# Patient Record
Sex: MALE | NOT HISPANIC OR LATINO | URBAN - METROPOLITAN AREA
[De-identification: names, ages, dates, MRNs, and addresses within clinical notes are randomized per-mention and may not be internally consistent; named-entity substitution may affect disease eponyms.]

---

## 2024-01-09 DIAGNOSIS — J30.2 SEASONAL ALLERGIES: ICD-10-CM

## 2024-01-09 PROBLEM — J18.9 COMMUNITY ACQUIRED PNEUMONIA: Status: RESOLVED | Noted: 2024-01-09 | Resolved: 2024-01-09

## 2024-01-09 PROBLEM — J18.9 PNEUMONIA: Status: RESOLVED | Noted: 2024-01-09 | Resolved: 2024-01-09

## 2024-01-09 RX ORDER — FLUTICASONE PROPIONATE 50 MCG
1 SPRAY, SUSPENSION (ML) NASAL DAILY
Qty: 16 G | Refills: 11 | Status: SHIPPED | OUTPATIENT
Start: 2024-01-09

## 2024-01-09 RX ORDER — FLUTICASONE PROPIONATE 50 MCG
1 SPRAY, SUSPENSION (ML) NASAL DAILY
COMMUNITY
Start: 2023-11-03 | End: 2024-01-09 | Stop reason: SDUPTHER

## 2024-01-31 ENCOUNTER — TELEPHONE (OUTPATIENT)
Dept: PRIMARY CARE | Facility: CLINIC | Age: 69
End: 2024-01-31
Payer: MEDICARE

## 2024-01-31 DIAGNOSIS — R13.19 OTHER DYSPHAGIA: Primary | ICD-10-CM

## 2024-01-31 NOTE — TELEPHONE ENCOUNTER
Bryanna contacted this nurse to request a modified barium swallow order for patient at the [facility] notes patient to have to orders for liquids: Honey/nectar thick.  Requests modified barium swallow to evaluation which is correct liquid consistency for patient.  Please advise.

## 2024-02-21 ENCOUNTER — HOSPITAL ENCOUNTER (OUTPATIENT)
Dept: RADIOLOGY | Facility: HOSPITAL | Age: 69
Discharge: HOME | End: 2024-02-21
Payer: MEDICARE

## 2024-02-21 DIAGNOSIS — R13.19 OTHER DYSPHAGIA: ICD-10-CM

## 2024-02-21 PROCEDURE — 92611 MOTION FLUOROSCOPY/SWALLOW: CPT | Mod: GN | Performed by: SPEECH-LANGUAGE PATHOLOGIST

## 2024-02-21 PROCEDURE — 74230 X-RAY XM SWLNG FUNCJ C+: CPT

## 2024-02-21 PROCEDURE — 3430000001 HC RX 343 DIAGNOSTIC RADIOPHARMACEUTICALS: Performed by: NURSE PRACTITIONER

## 2024-02-21 PROCEDURE — 2500000001 HC RX 250 WO HCPCS SELF ADMINISTERED DRUGS (ALT 637 FOR MEDICARE OP): Performed by: NURSE PRACTITIONER

## 2024-02-21 RX ADMIN — BARIUM SULFATE 90 ML: 400 SUSPENSION ORAL at 13:55

## 2024-02-21 RX ADMIN — BARIUM SULFATE 10 ML: 400 PASTE ORAL at 13:59

## 2024-02-21 RX ADMIN — BARIUM SULFATE 5 ML: 400 SUSPENSION ORAL at 13:57

## 2024-02-21 NOTE — PROCEDURES
"Speech-Language Pathology        Adult Outpatient Modified Barium Swallow Study     Patient Name: Donald Manweiler  MRN: [id number]  : 1955  Today's Date: 24     Time Calculation  Start Time: 1315  Stop Time: 1340  Time Calculation (min): 25 min    Recommendations:  Solid Diet Recommendations: Puree Solids (IDDSI 4)  Liquid Diet Recommendations: Mildly Thick Liquids (IDDSI 2, [place] Thick Liquids)  Medication Administration Recommendations: Crushed in puree  Compensatory Strategy Recommendations: Upright posture, Supervision/assistance with meals, Small Single bites/sips, Alternating liquids/solids, Aspiration precautions, Slow rate, Multiple swallows per bite, Oral care      Assessment/Impression:    Full detailed SLP/Radiologist Modified Barium Swallow study report can be found under Chart Review tab, Imaging tab and  titled \"FL Modified Barium Swallow Study\"      Study limited d/t patient cooperation. Full visualization difficulty at times due to patients rocking behavior and body habitus. No penetration or aspiration observed during the confines of this study. Oral phase dysphagia characterized by disorganized chewing/mashing, repetitive/disorganized tongue motion and residue collection. Delayed, yet functional, initiation of pharyngeal swallow. Pharyngeal residuals partially cleared with multiple swallows. Attempted to utilize liquid wash to achieve full pharyngeal clearance, however, patient refused. Thin liquids not presented as patient declined further trials despite max encouragement.       Plan:  Treatment/Interventions: Caregiver education and training in use of safe swallow compensatory strategies.       Education:   Pt/caregiver education provided re: results of MBS study, recommended diet and recommended safe swallow strategies; verbalized understanding.   "

## 2024-03-20 ENCOUNTER — TELEPHONE (OUTPATIENT)
Dept: PRIMARY CARE | Facility: CLINIC | Age: 69
End: 2024-03-20
Payer: MEDICARE

## 2024-03-20 NOTE — TELEPHONE ENCOUNTER
Phoned Arlette Little/ [address]  and confirmed 3/21/24 [facility] visit with Cleo Min NP. Phoned Wen ([phone]) at [place] [facility] workshop and updated her on provider's visit.

## 2024-03-21 ENCOUNTER — OFFICE VISIT (OUTPATIENT)
Dept: PRIMARY CARE | Facility: CLINIC | Age: 69
End: 2024-03-21
Payer: MEDICARE

## 2024-03-21 DIAGNOSIS — F72 SEVERE INTELLECTUAL DISABILITY: Chronic | ICD-10-CM

## 2024-03-21 DIAGNOSIS — N32.81 OVERACTIVE BLADDER: Chronic | ICD-10-CM

## 2024-03-21 DIAGNOSIS — I48.0 PAROXYSMAL ATRIAL FIBRILLATION (MULTI): Primary | ICD-10-CM

## 2024-03-21 DIAGNOSIS — R19.7 DIARRHEA, UNSPECIFIED TYPE: ICD-10-CM

## 2024-03-21 DIAGNOSIS — G40.909 SEIZURE DISORDER (MULTI): Chronic | ICD-10-CM

## 2024-03-21 PROBLEM — A41.9 SEPSIS (MULTI): Status: RESOLVED | Noted: 2024-03-21 | Resolved: 2024-03-21

## 2024-03-21 PROBLEM — J45.51 SEVERE PERSISTENT ASTHMA WITH ACUTE EXACERBATION (MULTI): Status: ACTIVE | Noted: 2024-03-21

## 2024-03-21 PROBLEM — K21.9 GASTROESOPHAGEAL REFLUX DISEASE WITHOUT ESOPHAGITIS: Status: ACTIVE | Noted: 2024-03-21

## 2024-03-21 PROBLEM — K59.01 SLOW TRANSIT CONSTIPATION: Status: ACTIVE | Noted: 2024-03-21

## 2024-03-21 PROBLEM — R50.9 FEVER: Status: RESOLVED | Noted: 2024-03-21 | Resolved: 2024-03-21

## 2024-03-21 PROBLEM — E55.9 VITAMIN D DEFICIENCY: Status: ACTIVE | Noted: 2024-03-21

## 2024-03-21 PROBLEM — K29.50 CHRONIC GASTRITIS WITHOUT BLEEDING: Status: ACTIVE | Noted: 2024-03-21

## 2024-03-21 PROBLEM — R26.2 IMPAIRED AMBULATION: Status: ACTIVE | Noted: 2020-04-29

## 2024-03-21 PROBLEM — H61.22 IMPACTED CERUMEN OF LEFT EAR: Status: RESOLVED | Noted: 2022-09-08 | Resolved: 2024-03-21

## 2024-03-21 PROBLEM — E78.5 HYPERLIPIDEMIA: Status: ACTIVE | Noted: 2020-04-28

## 2024-03-21 PROBLEM — M19.90 DEGENERATIVE JOINT DISEASE: Status: ACTIVE | Noted: 2024-03-21

## 2024-03-21 PROBLEM — R32 URINARY INCONTINENCE: Status: ACTIVE | Noted: 2024-03-21

## 2024-03-21 PROBLEM — G93.40 ENCEPHALOPATHY: Status: ACTIVE | Noted: 2020-04-28

## 2024-03-21 PROBLEM — Z86.0100 HISTORY OF COLONIC POLYPS: Status: ACTIVE | Noted: 2024-03-21

## 2024-03-21 PROBLEM — I10 ESSENTIAL HYPERTENSION: Status: ACTIVE | Noted: 2020-04-28

## 2024-03-21 PROBLEM — Z86.010 HISTORY OF COLONIC POLYPS: Status: ACTIVE | Noted: 2024-03-21

## 2024-03-21 PROBLEM — F65.89: Status: ACTIVE | Noted: 2024-03-21

## 2024-03-21 PROBLEM — J20.9 ACUTE BRONCHITIS: Status: RESOLVED | Noted: 2024-03-21 | Resolved: 2024-03-21

## 2024-03-21 PROBLEM — N40.0 BENIGN PROSTATIC HYPERPLASIA: Status: ACTIVE | Noted: 2024-03-21

## 2024-03-21 PROBLEM — N39.41 URGE INCONTINENCE: Status: ACTIVE | Noted: 2024-03-21

## 2024-03-21 PROCEDURE — 1159F MED LIST DOCD IN RCRD: CPT | Performed by: NURSE PRACTITIONER

## 2024-03-21 PROCEDURE — 99349 HOME/RES VST EST MOD MDM 40: CPT | Performed by: NURSE PRACTITIONER

## 2024-03-21 PROCEDURE — 1160F RVW MEDS BY RX/DR IN RCRD: CPT | Performed by: NURSE PRACTITIONER

## 2024-03-21 PROCEDURE — 3074F SYST BP LT 130 MM HG: CPT | Performed by: NURSE PRACTITIONER

## 2024-03-21 PROCEDURE — 1157F ADVNC CARE PLAN IN RCRD: CPT | Performed by: NURSE PRACTITIONER

## 2024-03-21 PROCEDURE — 3078F DIAST BP <80 MM HG: CPT | Performed by: NURSE PRACTITIONER

## 2024-03-21 RX ORDER — SERTRALINE HYDROCHLORIDE 100 MG/1
100 TABLET, FILM COATED ORAL DAILY
COMMUNITY

## 2024-03-21 RX ORDER — TAMSULOSIN HYDROCHLORIDE 0.4 MG/1
0.4 CAPSULE ORAL DAILY
COMMUNITY
Start: 2022-09-22 | End: 2024-03-29

## 2024-03-21 RX ORDER — SOTALOL HYDROCHLORIDE 120 MG/1
120 TABLET ORAL 2 TIMES DAILY
COMMUNITY
Start: 2022-09-22 | End: 2024-03-29

## 2024-03-21 RX ORDER — SENNOSIDES 8.6 MG/1
1 TABLET ORAL DAILY PRN
COMMUNITY

## 2024-03-21 RX ORDER — ACETAMINOPHEN 500 MG
2000 TABLET ORAL DAILY
COMMUNITY
End: 2024-03-29 | Stop reason: WASHOUT

## 2024-03-21 RX ORDER — DOCUSATE SODIUM 100 MG/1
100 CAPSULE, LIQUID FILLED ORAL DAILY PRN
COMMUNITY
End: 2024-03-23 | Stop reason: WASHOUT

## 2024-03-21 RX ORDER — MIRABEGRON 25 MG/1
25 TABLET, FILM COATED, EXTENDED RELEASE ORAL DAILY
COMMUNITY
End: 2024-03-29

## 2024-03-21 RX ORDER — FAMOTIDINE 20 MG/1
20 TABLET, FILM COATED ORAL NIGHTLY PRN
COMMUNITY

## 2024-03-21 RX ORDER — MIRTAZAPINE 7.5 MG/1
7.5 TABLET, FILM COATED ORAL NIGHTLY
COMMUNITY
Start: 2022-09-22 | End: 2024-03-29

## 2024-03-21 RX ORDER — LEVETIRACETAM 500 MG/1
500 TABLET ORAL 2 TIMES DAILY
COMMUNITY

## 2024-03-21 RX ORDER — OMEPRAZOLE 20 MG/1
20 CAPSULE, DELAYED RELEASE ORAL
COMMUNITY
Start: 2020-04-29 | End: 2024-03-29

## 2024-03-21 RX ORDER — LAMOTRIGINE 100 MG/1
100 TABLET ORAL 2 TIMES DAILY
COMMUNITY
Start: 2020-11-24

## 2024-03-21 RX ORDER — LISINOPRIL 10 MG/1
10 TABLET ORAL DAILY
COMMUNITY
End: 2024-03-29

## 2024-03-21 RX ORDER — LOPERAMIDE HCL 1MG/7.5ML
2 LIQUID (ML) ORAL 4 TIMES DAILY PRN
COMMUNITY
End: 2024-04-18

## 2024-03-23 VITALS
HEART RATE: 64 BPM | SYSTOLIC BLOOD PRESSURE: 118 MMHG | DIASTOLIC BLOOD PRESSURE: 64 MMHG | RESPIRATION RATE: 16 BRPM | OXYGEN SATURATION: 96 % | TEMPERATURE: 97.6 F | BODY MASS INDEX: 21.35 KG/M2 | WEIGHT: 136 LBS | HEIGHT: 67 IN

## 2024-03-23 ASSESSMENT — ENCOUNTER SYMPTOMS
DIARRHEA: 1
RESPIRATORY NEGATIVE: 1
EYES NEGATIVE: 1
APPETITE CHANGE: 1
ACTIVITY CHANGE: 0
SLEEP DISTURBANCE: 1
TROUBLE SWALLOWING: 1
NEUROLOGICAL NEGATIVE: 1

## 2024-03-23 NOTE — PROGRESS NOTES
Subjective   Patient ID: Donald Manweiler is a 69 y.o. male who presents for No chief complaint on file..    Patient seen today in a private group home residence. He is awake poor historian due to MRDD. House Nurse is present for home visits providing historical information. Patient is homebound due to overall functional decline related to his MRDD. PMH: Developmental Delay, H/o Seizure Disorder, H/o PAF, H/o GERD, and H/o HTN.     Patient is pleasant and cooperative with exam.  Staff reports that patient is doing well. He is ambulating short distances, however staff reports that they are utilizing wheelchair most of the time due to safety and frequent fall. Active with [facility], podiatry and six month ear cleaning at facility. Nurse reports using Debrox on a weekly basis for maintenance. Currently is on puree diet, and has had significant weight loss.  Staff reports that he is getting supplemental boost daily, and also has had chronic loose stool with at times 3-4 times daily.  Currently is taking stool softeners daily.   Cough has improved with scheduled nebulizer treatments and the use of honey consistent thick it for his beverages. Reported that he is doing well on pureed foods. Incontinent of bowel and bladder. Staff reports urinating adequate amounts.  He appears content, well cared for and cooperative with exam. Noted that any procedures including lab draws will require sedation.  Home Visit:          Medically necessary due to: the office visit would require excessive physical effort or pain for the patient , unsteady gait/poor balance , dementia/cognitive impairment.            Current Outpatient Medications:     cholecalciferol (Vitamin D-3) 50 mcg (2,000 unit) capsule, Take 1 capsule (50 mcg) by mouth early in the morning.., Disp: , Rfl:     docusate sodium (Colace) 100 mg capsule, Take 1 capsule (100 mg) by mouth once daily as needed., Disp: , Rfl:     famotidine (Pepcid) 20 mg tablet, Take 1 tablet (20  "mg) by mouth as needed at bedtime., Disp: , Rfl:     fluticasone (Flonase) 50 mcg/actuation nasal spray, Administer 1 spray into each nostril once daily., Disp: 16 g, Rfl: 11    lamoTRIgine (LaMICtal) 100 mg tablet, Take 1 tablet (100 mg) by mouth 2 times a day., Disp: , Rfl:     levETIRAcetam (Keppra) 500 mg tablet, Take 1 tablet (500 mg) by mouth 2 times a day., Disp: , Rfl:     lisinopril 10 mg tablet, Take 1 tablet (10 mg) by mouth once daily., Disp: , Rfl:     loperamide (Imodium A-D) 1 mg/7.5 mL liquid, 15 mL (2 mg) 4 times a day as needed., Disp: , Rfl:     mirabegron (Mybetriq) 25 mg tablet extended release 24 hr 24 hr tablet, Take 1 tablet (25 mg) by mouth once daily., Disp: , Rfl:     mirtazapine (Remeron) 7.5 mg tablet, Take 1 tablet (7.5 mg) by mouth once daily at bedtime., Disp: , Rfl:     omeprazole (PriLOSEC) 20 mg DR capsule, Take 1 capsule (20 mg) by mouth once daily in the morning. Take before meals., Disp: , Rfl:     sennosides (Senokot) 8.6 mg tablet, Take 1 tablet (8.6 mg) by mouth once daily as needed for constipation., Disp: , Rfl:     sertraline (Zoloft) 100 mg tablet, Take 1 tablet (100 mg) by mouth once daily., Disp: , Rfl:     sotalol (Betapace) 120 mg tablet, Take 1 tablet (120 mg) by mouth twice a day., Disp: , Rfl:     tamsulosin (Flomax) 0.4 mg 24 hr capsule, Take 1 capsule (0.4 mg) by mouth once daily., Disp: , Rfl:      Review of Systems   Constitutional:  Positive for appetite change. Negative for activity change.   HENT:  Positive for drooling and trouble swallowing.    Eyes: Negative.    Respiratory: Negative.     Gastrointestinal:  Positive for diarrhea.   Genitourinary: Negative.    Skin:  Positive for pallor.   Neurological: Negative.    Psychiatric/Behavioral:  Positive for sleep disturbance.    ALL REVIEW OF SYSTEM PROVIDED BY STAFF DUE TO MRDD     Objective   /64   Pulse 64   Temp 36.4 °C (97.6 °F) (Temporal)   Resp 16   Ht 1.702 m (5' 7\")   Wt 61.7 kg (136 lb)   " SpO2 96%   BMI 21.30 kg/m²     Physical Exam  Constitutional:       Appearance: Normal appearance.      Comments: Notable for weight loss   HENT:      Head: Normocephalic and atraumatic.      Nose: Nose normal.      Mouth/Throat:      Mouth: Mucous membranes are moist.      Pharynx: Oropharynx is clear.   Eyes:      Extraocular Movements: Extraocular movements intact.      Pupils: Pupils are equal, round, and reactive to light.   Cardiovascular:      Rate and Rhythm: Normal rate and regular rhythm.      Heart sounds: No murmur heard.  Pulmonary:      Effort: Pulmonary effort is normal.      Breath sounds: Normal breath sounds.   Abdominal:      General: Bowel sounds are normal.      Palpations: Abdomen is soft.   Musculoskeletal:      Cervical back: Neck supple. No tenderness.      Comments: Non ambulatory  Repetative rocking motion   Skin:     Capillary Refill: Capillary refill takes 2 to 3 seconds.   Neurological:      Mental Status: He is alert. Mental status is at baseline.      Motor: Weakness present.   Psychiatric:      Comments: MRDD         Assessment/Plan   Diagnoses and all orders for this visit:  Paroxysmal atrial fibrillation (CMS/[facility])  Comments:  Managed - continue betapace 120mg daily, lisinopril 10mg daily  Overactive bladder  Comments:  Continue tamsulosin 0.4mg daily  Severe intellectual disability  Comments:  Continue zoloft 100mg daily, , mirtazipine7.5mg Qhs  Seizure disorder (CMS/HCC)  Comments:  Managed - continue keppra 500mg 2x daily, lamictal 100mg 2 x daily  Diarrhea, unspecified type  Comments:  STOP SENNOSIDE & COLACE - may use imodium 1mg/7.5mL daily prn    Will continue with [facility] visits. Active with multiple specialist, however is however has limited support and difficulty getting out for appointments.      May stop scheduled colace 100mg daily - Stop scheduled sennacot daily - may use 1x daily PRN           Cleo Min, KAIA-CNP

## 2024-03-29 DIAGNOSIS — N32.81 OVERACTIVE BLADDER: ICD-10-CM

## 2024-03-29 DIAGNOSIS — K59.01 SLOW TRANSIT CONSTIPATION: ICD-10-CM

## 2024-03-29 DIAGNOSIS — M19.90 OSTEOARTHRITIS, UNSPECIFIED OSTEOARTHRITIS TYPE, UNSPECIFIED SITE: ICD-10-CM

## 2024-03-29 DIAGNOSIS — F41.9 ANXIETY: ICD-10-CM

## 2024-03-29 DIAGNOSIS — N40.0 BENIGN PROSTATIC HYPERPLASIA, UNSPECIFIED WHETHER LOWER URINARY TRACT SYMPTOMS PRESENT: ICD-10-CM

## 2024-03-29 DIAGNOSIS — E55.9 VITAMIN D DEFICIENCY: ICD-10-CM

## 2024-03-29 DIAGNOSIS — I10 ESSENTIAL HYPERTENSION: ICD-10-CM

## 2024-03-29 DIAGNOSIS — J30.2 SEASONAL ALLERGIES: ICD-10-CM

## 2024-03-29 DIAGNOSIS — K21.9 GASTROESOPHAGEAL REFLUX DISEASE WITHOUT ESOPHAGITIS: ICD-10-CM

## 2024-03-29 RX ORDER — SOTALOL HYDROCHLORIDE 120 MG/1
TABLET ORAL
Qty: 60 TABLET | Refills: 11 | Status: SHIPPED | OUTPATIENT
Start: 2024-03-29

## 2024-03-29 RX ORDER — TAMSULOSIN HYDROCHLORIDE 0.4 MG/1
CAPSULE ORAL
Qty: 30 CAPSULE | Refills: 11 | Status: SHIPPED | OUTPATIENT
Start: 2024-03-29

## 2024-03-29 RX ORDER — ACETAMINOPHEN 325 MG/1
TABLET ORAL
Qty: 174 TABLET | Refills: 11 | Status: SHIPPED | OUTPATIENT
Start: 2024-03-29

## 2024-03-29 RX ORDER — MIRABEGRON 25 MG/1
TABLET, FILM COATED, EXTENDED RELEASE ORAL
Qty: 30 TABLET | Refills: 11 | Status: SHIPPED | OUTPATIENT
Start: 2024-03-29

## 2024-03-29 RX ORDER — OMEPRAZOLE 20 MG/1
CAPSULE, DELAYED RELEASE ORAL
Qty: 30 CAPSULE | Refills: 11 | Status: SHIPPED | OUTPATIENT
Start: 2024-03-29

## 2024-03-29 RX ORDER — AMLODIPINE BESYLATE 2.5 MG/1
TABLET ORAL
Qty: 30 TABLET | Refills: 11 | Status: SHIPPED | OUTPATIENT
Start: 2024-03-29

## 2024-03-29 RX ORDER — CHOLECALCIFEROL (VITAMIN D3) 50 MCG
TABLET ORAL
Qty: 30 TABLET | Refills: 11 | Status: SHIPPED | OUTPATIENT
Start: 2024-03-29

## 2024-03-29 RX ORDER — LISINOPRIL 10 MG/1
TABLET ORAL
Qty: 60 TABLET | Refills: 11 | Status: SHIPPED | OUTPATIENT
Start: 2024-03-29

## 2024-03-29 RX ORDER — MIRTAZAPINE 7.5 MG/1
TABLET, FILM COATED ORAL
Qty: 30 TABLET | Refills: 11 | Status: SHIPPED | OUTPATIENT
Start: 2024-03-29

## 2024-03-29 RX ORDER — DOCUSATE SODIUM 100 MG/1
CAPSULE, LIQUID FILLED ORAL
Qty: 60 CAPSULE | Refills: 11 | Status: SHIPPED | OUTPATIENT
Start: 2024-03-29

## 2024-03-29 RX ORDER — LORATADINE 10 MG/1
TABLET ORAL
Qty: 30 TABLET | Refills: 11 | Status: SHIPPED | OUTPATIENT
Start: 2024-03-29

## 2024-04-18 DIAGNOSIS — K59.01 SLOW TRANSIT CONSTIPATION: ICD-10-CM

## 2024-04-18 RX ORDER — LOPERAMIDE HCL 1MG/7.5ML
LIQUID (ML) ORAL
Qty: 360 ML | Refills: 11 | Status: SHIPPED | OUTPATIENT
Start: 2024-04-18

## 2024-05-14 DIAGNOSIS — H61.23 BILATERAL IMPACTED CERUMEN: Primary | ICD-10-CM

## 2024-05-14 PROBLEM — J44.1 ACUTE EXACERBATION OF CHRONIC OBSTRUCTIVE AIRWAYS DISEASE (MULTI): Status: RESOLVED | Noted: 2024-05-14 | Resolved: 2024-05-14

## 2024-05-14 PROBLEM — J96.00 ACUTE RESPIRATORY FAILURE (MULTI): Status: RESOLVED | Noted: 2024-05-14 | Resolved: 2024-05-14

## 2024-05-14 RX ORDER — BISMUTH SUBSALICYLATE 1050 MG/30ML
SUSPENSION ORAL
Qty: 15 ML | Refills: 11 | Status: SHIPPED | OUTPATIENT
Start: 2024-05-14

## 2024-06-04 DIAGNOSIS — L21.8 ACUTE GENERALIZED SEBORRHEIC DERMATITIS: Primary | ICD-10-CM

## 2024-06-04 RX ORDER — KETOCONAZOLE 10 MG/ML
SHAMPOO TOPICAL
Qty: 200 ML | Refills: 10 | Status: SHIPPED | OUTPATIENT
Start: 2024-06-04

## 2024-07-02 ENCOUNTER — TELEPHONE (OUTPATIENT)
Dept: PRIMARY CARE | Facility: CLINIC | Age: 69
End: 2024-07-02
Payer: MEDICARE

## 2024-07-02 DIAGNOSIS — F72 SEVERE INTELLECTUAL DISABILITY: ICD-10-CM

## 2024-07-02 DIAGNOSIS — R26.2 IMPAIRED AMBULATION: ICD-10-CM

## 2024-07-02 NOTE — TELEPHONE ENCOUNTER
Labs sent to [facility] for draw as requested: CBC, BMP, PSA Vit D, Keppra level.  Referral for [facility], OT and ST entered and sent.

## 2024-07-07 ENCOUNTER — APPOINTMENT (OUTPATIENT)
Dept: RADIOLOGY | Facility: HOSPITAL | Age: 69
End: 2024-07-07
Payer: MEDICARE

## 2024-07-07 ENCOUNTER — HOSPITAL ENCOUNTER (EMERGENCY)
Facility: HOSPITAL | Age: 69
Discharge: HOME | End: 2024-07-07
Attending: EMERGENCY MEDICINE
Payer: MEDICARE

## 2024-07-07 ENCOUNTER — APPOINTMENT (OUTPATIENT)
Dept: CARDIOLOGY | Facility: HOSPITAL | Age: 69
End: 2024-07-07
Payer: MEDICARE

## 2024-07-07 VITALS
WEIGHT: 146.83 LBS | OXYGEN SATURATION: 96 % | DIASTOLIC BLOOD PRESSURE: 113 MMHG | HEART RATE: 86 BPM | SYSTOLIC BLOOD PRESSURE: 135 MMHG | HEIGHT: 68 IN | BODY MASS INDEX: 22.25 KG/M2 | RESPIRATION RATE: 15 BRPM | TEMPERATURE: 97.7 F

## 2024-07-07 DIAGNOSIS — R55 SYNCOPE, UNSPECIFIED SYNCOPE TYPE: Primary | ICD-10-CM

## 2024-07-07 LAB
ALBUMIN SERPL-MCNC: 4.2 G/DL (ref 3.5–5)
ALP BLD-CCNC: 44 U/L (ref 35–125)
ALT SERPL-CCNC: 14 U/L (ref 5–40)
ANION GAP SERPL CALC-SCNC: 10 MMOL/L
AST SERPL-CCNC: 19 U/L (ref 5–40)
BASOPHILS # BLD AUTO: 0.11 X10*3/UL (ref 0–0.1)
BASOPHILS NFR BLD AUTO: 1.8 %
BILIRUB SERPL-MCNC: 0.2 MG/DL (ref 0.1–1.2)
BUN SERPL-MCNC: 37 MG/DL (ref 8–25)
CALCIUM SERPL-MCNC: 9.4 MG/DL (ref 8.5–10.4)
CHLORIDE SERPL-SCNC: 102 MMOL/L (ref 97–107)
CO2 SERPL-SCNC: 29 MMOL/L (ref 24–31)
CREAT SERPL-MCNC: 1.1 MG/DL (ref 0.4–1.6)
EGFRCR SERPLBLD CKD-EPI 2021: 72 ML/MIN/1.73M*2
EOSINOPHIL # BLD AUTO: 0.25 X10*3/UL (ref 0–0.4)
EOSINOPHIL NFR BLD AUTO: 4 %
ERYTHROCYTE [DISTWIDTH] IN BLOOD BY AUTOMATED COUNT: 11.9 % (ref 11.5–14.5)
GLUCOSE SERPL-MCNC: 142 MG/DL (ref 65–99)
HCT VFR BLD AUTO: 42 % (ref 41–52)
HGB BLD-MCNC: 13.8 G/DL (ref 13.5–17.5)
IMM GRANULOCYTES # BLD AUTO: 0.02 X10*3/UL (ref 0–0.5)
IMM GRANULOCYTES NFR BLD AUTO: 0.3 % (ref 0–0.9)
LYMPHOCYTES # BLD AUTO: 2.14 X10*3/UL (ref 0.8–3)
LYMPHOCYTES NFR BLD AUTO: 34.2 %
MAGNESIUM SERPL-MCNC: 2.3 MG/DL (ref 1.6–3.1)
MCH RBC QN AUTO: 33 PG (ref 26–34)
MCHC RBC AUTO-ENTMCNC: 32.9 G/DL (ref 32–36)
MCV RBC AUTO: 101 FL (ref 80–100)
MONOCYTES # BLD AUTO: 0.44 X10*3/UL (ref 0.05–0.8)
MONOCYTES NFR BLD AUTO: 7 %
NEUTROPHILS # BLD AUTO: 3.3 X10*3/UL (ref 1.6–5.5)
NEUTROPHILS NFR BLD AUTO: 52.7 %
NRBC BLD-RTO: 0 /100 WBCS (ref 0–0)
PLATELET # BLD AUTO: 261 X10*3/UL (ref 150–450)
POTASSIUM SERPL-SCNC: 4.5 MMOL/L (ref 3.4–5.1)
PROT SERPL-MCNC: 7.9 G/DL (ref 5.9–7.9)
RBC # BLD AUTO: 4.18 X10*6/UL (ref 4.5–5.9)
SODIUM SERPL-SCNC: 141 MMOL/L (ref 133–145)
TROPONIN T SERPL-MCNC: 13 NG/L
TROPONIN T SERPL-MCNC: 14 NG/L
WBC # BLD AUTO: 6.3 X10*3/UL (ref 4.4–11.3)

## 2024-07-07 PROCEDURE — 84484 ASSAY OF TROPONIN QUANT: CPT

## 2024-07-07 PROCEDURE — 71045 X-RAY EXAM CHEST 1 VIEW: CPT

## 2024-07-07 PROCEDURE — 99283 EMERGENCY DEPT VISIT LOW MDM: CPT | Mod: 25

## 2024-07-07 PROCEDURE — 2500000004 HC RX 250 GENERAL PHARMACY W/ HCPCS (ALT 636 FOR OP/ED)

## 2024-07-07 PROCEDURE — 93005 ELECTROCARDIOGRAM TRACING: CPT

## 2024-07-07 PROCEDURE — 85025 COMPLETE CBC W/AUTO DIFF WBC: CPT

## 2024-07-07 PROCEDURE — 36415 COLL VENOUS BLD VENIPUNCTURE: CPT

## 2024-07-07 PROCEDURE — 83735 ASSAY OF MAGNESIUM: CPT

## 2024-07-07 PROCEDURE — 96360 HYDRATION IV INFUSION INIT: CPT

## 2024-07-07 PROCEDURE — 71045 X-RAY EXAM CHEST 1 VIEW: CPT | Performed by: RADIOLOGY

## 2024-07-07 PROCEDURE — 96361 HYDRATE IV INFUSION ADD-ON: CPT

## 2024-07-07 PROCEDURE — 80053 COMPREHEN METABOLIC PANEL: CPT

## 2024-07-07 ASSESSMENT — COLUMBIA-SUICIDE SEVERITY RATING SCALE - C-SSRS
1. IN THE PAST MONTH, HAVE YOU WISHED YOU WERE DEAD OR WISHED YOU COULD GO TO SLEEP AND NOT WAKE UP?: NO
6. HAVE YOU EVER DONE ANYTHING, STARTED TO DO ANYTHING, OR PREPARED TO DO ANYTHING TO END YOUR LIFE?: NO
2. HAVE YOU ACTUALLY HAD ANY THOUGHTS OF KILLING YOURSELF?: NO

## 2024-07-07 ASSESSMENT — LIFESTYLE VARIABLES
HAVE PEOPLE ANNOYED YOU BY CRITICIZING YOUR DRINKING: NO
TOTAL SCORE: 0
EVER FELT BAD OR GUILTY ABOUT YOUR DRINKING: NO
EVER HAD A DRINK FIRST THING IN THE MORNING TO STEADY YOUR NERVES TO GET RID OF A HANGOVER: NO
HAVE YOU EVER FELT YOU SHOULD CUT DOWN ON YOUR DRINKING: NO

## 2024-07-07 ASSESSMENT — PAIN DESCRIPTION - PROGRESSION: CLINICAL_PROGRESSION: NOT CHANGED

## 2024-07-07 ASSESSMENT — PAIN SCALES - GENERAL
PAINLEVEL_OUTOF10: 0 - NO PAIN
PAINLEVEL_OUTOF10: 0 - NO PAIN

## 2024-07-07 ASSESSMENT — PAIN - FUNCTIONAL ASSESSMENT: PAIN_FUNCTIONAL_ASSESSMENT: 0-10

## 2024-07-07 NOTE — PROGRESS NOTES
IN BRIEF    History: 69 male with a history of MRDD secondary to cerebral palsy presents with possible syncope.  According to EMS they were called for an unresponsive.  He was witnessed with episode of unresponsiveness while sitting up in a chair.  He has a history of seizure activity however there was no witnessed jerking.    Exam: Patient calm and pleasant.  He is minimally verbal but able to answer yes/no questions.  No acute distress.    EKG personally interpreted by me performed at 823  Normal sinus rhythm with ventricular 85 normal axis and intervals no acute ischemic changes       ED COURSE   MDM: Patient presents after apparent syncopal episode.  Cardiac workup initiated returned showing no acute abnormalities including negative troponin and delta troponin.  There were no episodes of syncope while in the emergency department.  He will be discharged back to his group home in stable condition      I personally saw the patient and made/approved the management plan and take responsibility for the patient management.  Parts of this chart were completed with dictation software, please excuse any errors in transcription.     Shy Wiseman,   3:09 PM

## 2024-07-07 NOTE — DISCHARGE INSTRUCTIONS
Please follow-up closely with your primary care provider in the following week.  Follow-up closely with cardiology listed.

## 2024-07-07 NOTE — ED PROVIDER NOTES
HPI   Chief Complaint   Patient presents with    Syncope     Pt bib ems from group home for a syncopal episode that occurred earlier today. Pt is A &O X1 at baseline.        Patient is a 69-year-old male who presents emergency department for evaluation of episode of reported syncope.  Patient has a history of severe developmental delay, cerebral palsy, and is ANO 1 at baseline and relatively nonverbal.  Patient lives at a group home where EMS was called for reported unresponsive episode.  EMS reports what they feel is a syncopal episode as patient was sitting in a chair when he lost consciousness and then later regained consciousness.  Patient shakes head no when asked if having any pain.  He denies any chest pain, shortness of breath and is alert, active and in good spirits.  EMS reports no other symptoms at this time and no fall or injury.      History provided by:  Patient   used: No                        [place] Coma Scale Score: 15                     Patient History   Past Medical History:   Diagnosis Date    Acute bronchitis 03/21/2024    Acute exacerbation of chronic obstructive airways disease (Multi) 05/14/2024    Acute respiratory failure (Multi) 05/14/2024    Colitis     presumed ischemic    Community acquired pneumonia     CP (cerebral palsy) (Multi)     Fever 03/21/2024    HTN (hypertension)     Hyperlipidemia     Impacted cerumen of left ear 09/08/2022    Paroxysmal atrial fibrillation (Multi)     Pneumonia     Recurrent seizures (Multi)     Sepsis (Multi) 03/21/2024    Severe developmental delay      No past surgical history on file.  No family history on file.  Social History     Tobacco Use    Smoking status: Never    Smokeless tobacco: Never   Substance Use Topics    Alcohol use: Not on file    Drug use: Not on file       Physical Exam   ED Triage Vitals [07/07/24 0754]   Temperature Heart Rate Respirations BP   36.5 °C (97.7 °F) 79 16 (!) 136/91      Pulse Ox Temp Source  Heart Rate Source Patient Position   96 % Temporal Monitor Sitting      BP Location FiO2 (%)     Left arm --       Physical Exam  Constitutional:       Appearance: Normal appearance.   Eyes:      Extraocular Movements: Extraocular movements intact.      Pupils: Pupils are equal, round, and reactive to light.   Cardiovascular:      Rate and Rhythm: Normal rate and regular rhythm.   Pulmonary:      Effort: Pulmonary effort is normal.      Breath sounds: Normal breath sounds.   Abdominal:      General: Abdomen is flat.      Palpations: Abdomen is soft.      Tenderness: There is no abdominal tenderness.   Musculoskeletal:         General: Normal range of motion.   Skin:     General: Skin is warm and dry.   Neurological:      General: No focal deficit present.      Mental Status: He is alert. Mental status is at baseline.         ED Course & MDM   Diagnoses as of 07/07/24 1614   Syncope, unspecified syncope type       Medical Decision Making  Patient is a 69-year-old male with severe developmental delay who presents for evaluation of syncopal episode.    EKG was interpreted by attending physician and reviewed by me.    Lab work done today included CMP, CBC, magnesium, troponins.  Lab work with initial troponin of 13 and repeat troponin of 14 within normal range and no other acute abnormalities.    Scans done today were interpreted/confirmed by radiologist and also interpreted by me which included chest x-ray.  Chest x-ray shows no consolidations effusions or pneumothorax with no displaced rib fractures with tortuous aorta noted but stable since previous exam.    Medications given at today's visit include IV fluids    I saw this patient in conjunction with Dr. Wiseman.  Lab work without acute normality with troponins within normal range and stable.  EKG shows no evidence of ischemia or arrhythmia.  Patient remained stable in the emergency department with no recurrent episodes of syncope.  He is hemodynamically stable  oxygenating well on room air.  No indication for admission or further workup at this time.  He is otherwise stable to be discharged back to group home with close follow-up outpatient with cardiology given his age and history.  Caregiver at bedside is agreeable to plan of discharge at this time emergent pathologies were considered for this patient, although I have low suspicion for anything acutely emergent given patient's clinical presentation, history, physical exam, stable vital signs, and relatively unremarkable workup.  Discharging patient home is reasonable plan of care for outpatient management.    All labs, imaging, and diagnostic studies were reviewed by me and caregiver was counseled on clinical impression, expectations, and plan.  Caregiver was educated to follow-up with PCP in the following 1-2 days.  All questions from caregiver were answered. They elicited understanding and were agreeable to course of treatment.  Patient was discharged in stable condition and given strict return precautions.    ** Disclaimer:  Parts of this document were written utilizing a voice to text dictation software.  Note may contain minor transcription or typographical errors that were inadvertently transcribed by the computer software.        Procedure  Procedures     Daisy Mattson PA-C  07/07/24 6368

## 2024-07-10 LAB
ATRIAL RATE: 85 BPM
P AXIS: 62 DEGREES
P OFFSET: 195 MS
P ONSET: 144 MS
PR INTERVAL: 146 MS
Q ONSET: 217 MS
QRS COUNT: 14 BEATS
QRS DURATION: 94 MS
QT INTERVAL: 366 MS
QTC CALCULATION(BAZETT): 435 MS
QTC FREDERICIA: 411 MS
R AXIS: 72 DEGREES
T AXIS: 48 DEGREES
T OFFSET: 400 MS
VENTRICULAR RATE: 85 BPM

## 2024-07-15 NOTE — ASSESSMENT & PLAN NOTE
We have utilized Sotalol as antiarrhythmic.  No anticoagulant secondary to GI bleeds when we used anticoagulation in past.  Recent EKG, SR with Qtc of 435 ms.

## 2024-07-16 ENCOUNTER — OFFICE VISIT (OUTPATIENT)
Dept: CARDIOLOGY | Facility: CLINIC | Age: 69
End: 2024-07-16
Payer: MEDICARE

## 2024-07-16 DIAGNOSIS — I10 ESSENTIAL HYPERTENSION: ICD-10-CM

## 2024-07-16 DIAGNOSIS — I48.0 PAROXYSMAL ATRIAL FIBRILLATION (MULTI): Primary | ICD-10-CM

## 2024-07-24 PROBLEM — R40.1 CLOUDED CONSCIOUSNESS: Status: RESOLVED | Noted: 2024-07-24 | Resolved: 2024-07-24

## 2024-07-24 PROBLEM — T14.8XXA HEMATOMA: Status: RESOLVED | Noted: 2024-07-24 | Resolved: 2024-07-24

## 2024-07-24 PROBLEM — N17.9 ACUTE RENAL FAILURE SYNDROME (CMS-HCC): Status: RESOLVED | Noted: 2024-07-24 | Resolved: 2024-07-24

## 2024-07-24 PROBLEM — F51.05 INSOMNIA DUE TO OTHER MENTAL DISORDER: Status: ACTIVE | Noted: 2024-07-24
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
## 2024-07-24 PROBLEM — F99 INSOMNIA DUE TO OTHER MENTAL DISORDER: Status: ACTIVE | Noted: 2024-07-24

## 2024-07-24 PROBLEM — R26.9 ABNORMAL GAIT: Status: ACTIVE | Noted: 2022-02-01

## 2024-07-24 PROBLEM — G25.71 AKATHISIA: Status: ACTIVE | Noted: 2024-07-24

## 2024-07-29 DIAGNOSIS — J30.2 SEASONAL ALLERGIES: ICD-10-CM

## 2024-07-29 RX ORDER — FLUTICASONE PROPIONATE 50 MCG
1 SPRAY, SUSPENSION (ML) NASAL DAILY
Qty: 16 G | Refills: 11 | Status: SHIPPED | OUTPATIENT
Start: 2024-07-29

## 2024-07-29 NOTE — TELEPHONE ENCOUNTER
Arlette states pt is out of medication and their pharmacy has not delivered. She is asking if refill can be sent locally.

## 2024-07-30 ENCOUNTER — OFFICE VISIT (OUTPATIENT)
Dept: PRIMARY CARE | Facility: CLINIC | Age: 69
End: 2024-07-30
Payer: MEDICARE

## 2024-07-30 DIAGNOSIS — N32.81 OVERACTIVE BLADDER: ICD-10-CM

## 2024-07-30 DIAGNOSIS — I10 ESSENTIAL HYPERTENSION: ICD-10-CM

## 2024-07-30 DIAGNOSIS — R19.7 DIARRHEA, UNSPECIFIED TYPE: Primary | ICD-10-CM

## 2024-07-30 DIAGNOSIS — F72 SEVERE INTELLECTUAL DISABILITY: ICD-10-CM

## 2024-07-30 DIAGNOSIS — G40.909 SEIZURE DISORDER (MULTI): ICD-10-CM

## 2024-07-30 PROCEDURE — 3074F SYST BP LT 130 MM HG: CPT | Performed by: NURSE PRACTITIONER

## 2024-07-30 PROCEDURE — 99349 HOME/RES VST EST MOD MDM 40: CPT | Performed by: NURSE PRACTITIONER

## 2024-07-30 PROCEDURE — 1157F ADVNC CARE PLAN IN RCRD: CPT | Performed by: NURSE PRACTITIONER

## 2024-07-30 PROCEDURE — 3078F DIAST BP <80 MM HG: CPT | Performed by: NURSE PRACTITIONER

## 2024-08-06 VITALS
OXYGEN SATURATION: 95 % | RESPIRATION RATE: 16 BRPM | HEART RATE: 88 BPM | SYSTOLIC BLOOD PRESSURE: 126 MMHG | WEIGHT: 136 LBS | BODY MASS INDEX: 21.35 KG/M2 | TEMPERATURE: 96.7 F | HEIGHT: 67 IN | DIASTOLIC BLOOD PRESSURE: 78 MMHG

## 2024-08-06 PROBLEM — R19.7 DIARRHEA: Status: ACTIVE | Noted: 2024-08-06

## 2024-08-29 ENCOUNTER — TELEPHONE (OUTPATIENT)
Dept: PRIMARY CARE | Facility: CLINIC | Age: 69
End: 2024-08-29
Payer: MEDICARE

## 2024-08-29 DIAGNOSIS — H61.23 BILATERAL IMPACTED CERUMEN: Primary | ICD-10-CM

## 2024-08-29 NOTE — TELEPHONE ENCOUNTER
Need debrox order adjusted to be only starting 4 days prior to ear doctor appts. They are giving them to patient three times every day. Also pt saw neurologist yesterday and they discontinued all suppositories for seizures and are starting a nasal spray. Arlette said she will call back tomorrow with name of new med.

## 2024-11-06 ENCOUNTER — TELEPHONE (OUTPATIENT)
Dept: PRIMARY CARE | Facility: CLINIC | Age: 69
End: 2024-11-06
Payer: MEDICARE

## 2024-11-06 NOTE — TELEPHONE ENCOUNTER
Request for testing, PSA.  Last PSA completed 7/19/23 by Dr. [facility].  0.86. Please Advise.

## 2024-11-07 NOTE — TELEPHONE ENCOUNTER
Lab orders sent to [facility] home lab draw for draw at patient day program, form scanned to chart.

## 2024-12-18 ENCOUNTER — TELEPHONE (OUTPATIENT)
Dept: PRIMARY CARE | Facility: CLINIC | Age: 69
End: 2024-12-18
Payer: MEDICARE

## 2024-12-18 NOTE — TELEPHONE ENCOUNTER
12/19/24 [facility] visit with Cleo Min NP confirmed via phone with Arlette Little/ caregiver at [facility].   CLOVER Chong wanted you to be aware patient had a cardiology visit yesterday.

## 2024-12-19 ENCOUNTER — OFFICE VISIT (OUTPATIENT)
Dept: PRIMARY CARE | Facility: CLINIC | Age: 69
End: 2024-12-19
Payer: MEDICARE

## 2024-12-19 VITALS
SYSTOLIC BLOOD PRESSURE: 112 MMHG | TEMPERATURE: 96.8 F | HEART RATE: 76 BPM | DIASTOLIC BLOOD PRESSURE: 60 MMHG | OXYGEN SATURATION: 97 %

## 2024-12-19 DIAGNOSIS — F72 SEVERE INTELLECTUAL DISABILITY: Chronic | ICD-10-CM

## 2024-12-19 DIAGNOSIS — R19.7 DIARRHEA, UNSPECIFIED TYPE: ICD-10-CM

## 2024-12-19 DIAGNOSIS — G40.409 OTHER GENERALIZED EPILEPSY, NOT INTRACTABLE, WITHOUT STATUS EPILEPTICUS: Chronic | ICD-10-CM

## 2024-12-19 DIAGNOSIS — I10 ESSENTIAL HYPERTENSION: Primary | ICD-10-CM

## 2024-12-19 PROCEDURE — 3074F SYST BP LT 130 MM HG: CPT | Performed by: NURSE PRACTITIONER

## 2024-12-19 PROCEDURE — 1160F RVW MEDS BY RX/DR IN RCRD: CPT | Performed by: NURSE PRACTITIONER

## 2024-12-19 PROCEDURE — 1157F ADVNC CARE PLAN IN RCRD: CPT | Performed by: NURSE PRACTITIONER

## 2024-12-19 PROCEDURE — 3078F DIAST BP <80 MM HG: CPT | Performed by: NURSE PRACTITIONER

## 2024-12-19 PROCEDURE — 99348 HOME/RES VST EST LOW MDM 30: CPT | Performed by: NURSE PRACTITIONER

## 2024-12-19 PROCEDURE — 1159F MED LIST DOCD IN RCRD: CPT | Performed by: NURSE PRACTITIONER

## 2024-12-19 NOTE — PROGRESS NOTES
"Subjective   Patient ID: Donald Manweiler is a 69 y.o. male who presents for Follow-up (Functional decline).    Patient seen today in a private group home day care. He is awake poor historian due to MRDD, dependent on staff for all historical information, and does have a guardian. House Nurse is present for home visits providing historical information. Patient is homebound due to overall functional decline related to his MRDD. PMH: Developmental Delay, H/o Seizure Disorder, H/o PAF, H/o GERD, and H/o HTN.     Arlette -  reports cardiology visit yesterday. Visibly appears to have weight loss. Currently is on puree diet, and staff also reports that appetite has improved and is eating three good meals daily when he is fed. Diarrhea has improved \"a little\". he has had significant weight loss.  Continues with intermittent diarrhea, confirmed with  that stool softeners have been discontinued.  Staff does not have actual weights recorded, however visibly appears thinning.  He is now wearing a helmet for his own safety.  Remains in his recliner chair most of the day, often relaxed, today he is rocking back and forth.  Dependent on all ADL's.  Incontinent of bowel and bladder.  Appears to be urinating adequately with no s/s of infection at this time.  No additional syncopal episodes or falls noted. There is no fever, or chills noted. No nausea, vomiting.  There is no hematuria, dysuria, or noted increased urgency.     Home Visit: Medically necessary due to: dementia/cognitive impairment, unsteady gait/poor balance, shortness of breath with minimal exertion, Illness or condition that results in activity limitation or restriction that impacts the ability to leave home such as: equipment and /or human assistance needed to safely leave the home, patient has limited support systems to help the patient attend office visits, the office visit would require excessive physical effort or pain for the patient.    "                    Current Outpatient Medications:     levETIRAcetam (Keppra) 500 mg tablet, Take 1 tablet (500 mg) by mouth 2 times a day., Disp: , Rfl:     acetaminophen (Tylenol) 325 mg tablet, GIVE TWO (2) TABLETS (650MG) BY MOUTH THREE TIMES DAILY., Disp: 174 tablet, Rfl: 11    amLODIPine (Norvasc) 2.5 mg tablet, GIVE ONE TABLET BY MOUTH ONCE DAILY FOR HYPERTENSION, Disp: 30 tablet, Rfl: 11    carbamide peroxide (Debrox) 6.5 % otic solution, Administer 5 drops into each ear 2 times a day. Administer 5 drops into each ear 2 times daily 4 days prior to doctors appointment only then stop, Disp: 15 mL, Rfl: 0    cholecalciferol (Vitamin D-3) 50 MCG (2000 UT) tablet, GIVE ONE TABLET BY MOUTH ONCE DAILY FOR VITAMIN D DEFICIENCY, Disp: 30 tablet, Rfl: 11    famotidine (Pepcid) 20 mg tablet, Take 1 tablet (20 mg) by mouth as needed at bedtime., Disp: , Rfl:     fluticasone (Flonase) 50 mcg/actuation nasal spray, Administer 1 spray into each nostril once daily., Disp: 16 g, Rfl: 11    lamoTRIgine (LaMICtal) 100 mg tablet, Take 1 tablet (100 mg) by mouth 2 times a day., Disp: , Rfl:     lisinopril 10 mg tablet, GIVE ONE TABLET BY MOUTH EVERY TWELVE HOURS FOR HYPERTENSION, Disp: 60 tablet, Rfl: 11    loperamide (Imodium A-D) 1 mg/7.5 mL liquid, GIVE 2 TABLESPOONSFUL (30ML) BY MOUTH INITIALLY THEN GIVE 1 TABLESPOONFUL (15ML) BY MOUTH AFTER EACH LOOSE STOOL AS NEEDED. MAX QTY: 4 TABLESPOONSFUL (60ML)/DAY, Disp: 360 mL, Rfl: 11    loratadine (Claritin) 10 mg tablet, GIVE ONE TABLET BY MOUTH ONCE DAILY FOR SEASONAL ALLERGY SYMPTOMS, Disp: 30 tablet, Rfl: 11    mirtazapine (Remeron) 7.5 mg tablet, GIVE ONE TABLET BY MOUTH AT BEDTIME. (DX: INSOMNIA), Disp: 30 tablet, Rfl: 11    Myrbetriq 25 mg tablet extended release 24 hr 24 hr tablet, GIVE ONE TABLET BY MOUTH ONCE DAILY (DX: BPH), Disp: 30 tablet, Rfl: 11    Nizoral A-D 1 % shampoo, USE AS DIRECTED TO SHAMPOO LIBERALLY TO SKIN TWICE WEEKLY    **REORDER WHEN NEEDED-NOT A CYCLE  FILL MED**, Disp: 200 mL, Rfl: 10    omeprazole (PriLOSEC) 20 mg DR capsule, GIVE ONE CAPSULE BY MOUTH EVERY MORNING 30 MINUTES BEFORE MORNING MEAL (DX: GASTRITIS), Disp: 30 capsule, Rfl: 11    sertraline (Zoloft) 100 mg tablet, Take 1 tablet (100 mg) by mouth once daily., Disp: , Rfl:     sotalol (Betapace) 120 mg tablet, GIVE ONE TABLET BY MOUTH TWICE DAILY (DX: PAROXYSMAL A-FIB), Disp: 60 tablet, Rfl: 11    tamsulosin (Flomax) 0.4 mg 24 hr capsule, GIVE ONE CAPSULE BY MOUTH AT BEDTIME (DX: BPH), Disp: 30 capsule, Rfl: 11     Review of Systems   Unable to perform ROS: Patient nonverbal       Objective   /60   Pulse 76   Temp 36 °C (96.8 °F) (Temporal)   SpO2 97%     Physical Exam  Constitutional:       Appearance: Normal appearance.      Comments: Notable for weight loss   HENT:      Head: Normocephalic and atraumatic.      Nose: Nose normal.      Mouth/Throat:      Mouth: Mucous membranes are moist.      Pharynx: Oropharynx is clear.   Eyes:      Extraocular Movements: Extraocular movements intact.      Pupils: Pupils are equal, round, and reactive to light.   Cardiovascular:      Rate and Rhythm: Normal rate and regular rhythm.      Heart sounds: No murmur heard.  Pulmonary:      Effort: Pulmonary effort is normal.      Breath sounds: Normal breath sounds.   Abdominal:      General: Bowel sounds are normal.      Palpations: Abdomen is soft.   Musculoskeletal:      Cervical back: Neck supple. No tenderness.      Comments: Non ambulatory  Repetative rocking motion   Skin:     Capillary Refill: Capillary refill takes 2 to 3 seconds.   Neurological:      Mental Status: He is alert. Mental status is at baseline.      Motor: Weakness present.   Psychiatric:      Comments: MRDD     Lab Results   Component Value Date    WBC 6.3 07/07/2024    HGB 13.8 07/07/2024    HCT 42.0 07/07/2024     (H) 07/07/2024     07/07/2024      Lab Results   Component Value Date    GLUCOSE 142 (H) 07/07/2024    CALCIUM  9.4 07/07/2024     07/07/2024    K 4.5 07/07/2024    CO2 29 07/07/2024     07/07/2024    BUN 37 (H) 07/07/2024    CREATININE 1.10 07/07/2024        Assessment/Plan   Diagnoses and all orders for this visit:  Essential hypertension  Comments:  Managed - cont lisinopril 10mg daily, sotalol 120mg daily  Diarrhea, unspecified type  Comments:  Cont loperamide 1mg liquid daily PRN after loose stool  Other generalized epilepsy, not intractable, without status epilepticus  Comments:  Delicately stable - cont with safety measures, lamictal 100mg tabs bid, keppra 500mg bid  Severe intellectual disability  Comments:  Continue with supportive care and safety measures in monitored controlled environment      More than 50% of time spent in face-to-face discussion @ a total of 30 minutes with this patient on counseling, coordination of care, and review of medical records and diagnostics. Collaboration with Arlette oneill , and guardian.    Will continue with [facility] visits. Patient has limited support, limited mobility and unable to navigate to traditional office appointments.       Will consider PSA screening with next lab draw    Treating mor palliative at this time    Cleo Min, APRN-CNP

## 2025-02-18 NOTE — ASSESSMENT & PLAN NOTE
Patient not seen since August 2021.  At that point we were utilizing sotalol for suppression of the atrial fibrillation.  He had GI bleeds secondary to colitis on anticoagulation therapy thus that was discontinued.  Regular rhythm on exam today thus the sotalol appears to be relatively effective.  No change in medications.  A once yearly evaluation would be adequate, staff requests next visit in warmer weather thus we will see in the fall and then annual visits after that point.

## 2025-02-20 ENCOUNTER — OFFICE VISIT (OUTPATIENT)
Dept: CARDIOLOGY | Facility: CLINIC | Age: 70
End: 2025-02-20
Payer: MEDICARE

## 2025-02-20 VITALS — SYSTOLIC BLOOD PRESSURE: 114 MMHG | HEART RATE: 80 BPM | DIASTOLIC BLOOD PRESSURE: 70 MMHG

## 2025-02-20 DIAGNOSIS — I10 ESSENTIAL HYPERTENSION: ICD-10-CM

## 2025-02-20 DIAGNOSIS — I48.0 PAROXYSMAL ATRIAL FIBRILLATION (MULTI): Primary | ICD-10-CM

## 2025-02-20 PROCEDURE — 1157F ADVNC CARE PLAN IN RCRD: CPT | Performed by: INTERNAL MEDICINE

## 2025-02-20 PROCEDURE — 99213 OFFICE O/P EST LOW 20 MIN: CPT | Performed by: INTERNAL MEDICINE

## 2025-02-20 PROCEDURE — 1126F AMNT PAIN NOTED NONE PRSNT: CPT | Performed by: INTERNAL MEDICINE

## 2025-02-20 PROCEDURE — 1036F TOBACCO NON-USER: CPT | Performed by: INTERNAL MEDICINE

## 2025-02-20 PROCEDURE — 3074F SYST BP LT 130 MM HG: CPT | Performed by: INTERNAL MEDICINE

## 2025-02-20 PROCEDURE — 1159F MED LIST DOCD IN RCRD: CPT | Performed by: INTERNAL MEDICINE

## 2025-02-20 PROCEDURE — 3078F DIAST BP <80 MM HG: CPT | Performed by: INTERNAL MEDICINE

## 2025-02-20 RX ORDER — QUETIAPINE FUMARATE 25 MG/1
25 TABLET, FILM COATED ORAL 2 TIMES DAILY
COMMUNITY

## 2025-02-20 ASSESSMENT — ENCOUNTER SYMPTOMS
OCCASIONAL FEELINGS OF UNSTEADINESS: 1
DEPRESSION: 0
LOSS OF SENSATION IN FEET: 0

## 2025-02-20 ASSESSMENT — PATIENT HEALTH QUESTIONNAIRE - PHQ9
SUM OF ALL RESPONSES TO PHQ9 QUESTIONS 1 AND 2: 0
2. FEELING DOWN, DEPRESSED OR HOPELESS: NOT AT ALL
1. LITTLE INTEREST OR PLEASURE IN DOING THINGS: NOT AT ALL

## 2025-02-20 ASSESSMENT — LIFESTYLE VARIABLES: TOTAL SCORE: 0

## 2025-02-20 ASSESSMENT — PAIN SCALES - GENERAL: PAINLEVEL_OUTOF10: 0-NO PAIN

## 2025-02-20 NOTE — ASSESSMENT & PLAN NOTE
Blood pressure appears to be adequately controlled on the amlodipine and lisinopril.  Will continue these medications and have routine checks of blood pressure in the future.

## 2025-02-20 NOTE — PROGRESS NOTES
Subjective   Donald Manweiler is a 70 y.o. male.    Chief Complaint:  overdue follow up    HPI  Patient brought from nursing home and staff says they have not noted any cardiac issues such as arrhythmias or palpitations.  Brings him in today for just a routine reassessment.    ROS  Unable to obtain review of symptoms secondary to patient being essentially nonverbal  Objective   Constitutional:       Appearance: Not in distress.   Eyes:      Conjunctiva/sclera: Conjunctivae normal.   Neck:      Vascular: JVD normal.   Pulmonary:      Breath sounds: Normal breath sounds. No wheezing. No rhonchi. No rales.   Cardiovascular:      Normal rate. Regular rhythm.      Murmurs: There is no murmur.      No gallop.  No click. No rub.   Abdominal:      Palpations: Abdomen is soft.   Neurological:      General: No focal deficit present.      Mental Status: Alert.         Lab Review:       Assessment/Plan   The primary encounter diagnosis was Paroxysmal atrial fibrillation (Multi). A diagnosis of Essential hypertension was also pertinent to this visit.    Paroxysmal atrial fibrillation (Multi)  Patient not seen since August 2021.  At that point we were utilizing sotalol for suppression of the atrial fibrillation.  He had GI bleeds secondary to colitis on anticoagulation therapy thus that was discontinued.  Regular rhythm on exam today thus the sotalol appears to be relatively effective.  No change in medications.  A once yearly evaluation would be adequate, staff requests next visit in warmer weather thus we will see in the fall and then annual visits after that point.    Essential hypertension  Blood pressure appears to be adequately controlled on the amlodipine and lisinopril.  Will continue these medications and have routine checks of blood pressure in the future.

## 2025-04-17 ENCOUNTER — TELEPHONE (OUTPATIENT)
Dept: PRIMARY CARE | Facility: CLINIC | Age: 70
End: 2025-04-17
Payer: MEDICARE

## 2025-04-17 NOTE — TELEPHONE ENCOUNTER
[facility] visit for 4/18 confirmed with Reji Barnhart [place] caregiver, Arlette Little. Travel Screen negative. Confirmed patient will be at day program- as for previous visits.

## 2025-04-18 ENCOUNTER — OFFICE VISIT (OUTPATIENT)
Dept: PRIMARY CARE | Facility: CLINIC | Age: 70
End: 2025-04-18
Payer: MEDICARE

## 2025-04-18 DIAGNOSIS — F51.05 INSOMNIA DUE TO OTHER MENTAL DISORDER: ICD-10-CM

## 2025-04-18 DIAGNOSIS — I10 ESSENTIAL HYPERTENSION: ICD-10-CM

## 2025-04-18 DIAGNOSIS — G40.909 SEIZURE DISORDER (MULTI): Chronic | ICD-10-CM

## 2025-04-18 DIAGNOSIS — F99 INSOMNIA DUE TO OTHER MENTAL DISORDER: ICD-10-CM

## 2025-04-18 DIAGNOSIS — K58.2 IRRITABLE BOWEL SYNDROME WITH BOTH CONSTIPATION AND DIARRHEA: Primary | ICD-10-CM

## 2025-04-18 DIAGNOSIS — R26.2 IMPAIRED AMBULATION: ICD-10-CM

## 2025-04-18 DIAGNOSIS — F72 SEVERE INTELLECTUAL DISABILITY: ICD-10-CM

## 2025-04-18 DIAGNOSIS — R19.7 DIARRHEA, UNSPECIFIED TYPE: ICD-10-CM

## 2025-04-18 DIAGNOSIS — N39.42 URINARY INCONTINENCE WITHOUT SENSORY AWARENESS: ICD-10-CM

## 2025-04-18 PROCEDURE — 1157F ADVNC CARE PLAN IN RCRD: CPT | Performed by: NURSE PRACTITIONER

## 2025-04-18 PROCEDURE — 1159F MED LIST DOCD IN RCRD: CPT | Performed by: NURSE PRACTITIONER

## 2025-04-18 PROCEDURE — 1160F RVW MEDS BY RX/DR IN RCRD: CPT | Performed by: NURSE PRACTITIONER

## 2025-04-18 PROCEDURE — 99349 HOME/RES VST EST MOD MDM 40: CPT | Performed by: NURSE PRACTITIONER

## 2025-04-18 NOTE — PROGRESS NOTES
Subjective   Patient ID: Donald Manweiler is a 70 y.o. male who presents for No chief complaint on file..    Patient seen today in a private group home day care. He is awake poor historian due to MRDD, dependent on staff for all historical information, and does have a guardian. House Nurse is present for home visits providing historical information. Patient is homebound due to overall functional decline related to his MRDD. PMH: Developmental Delay, H/o Seizure Disorder, H/o PAF, H/o GERD, and H/o HTN.          Current Medications[1]     Review of Systems    Objective   There were no vitals taken for this visit.    Physical Exam    Assessment/Plan   {Assess/PlanSmartLinks:05488}             Cleo Min, APRN-CNP        [1]   Current Outpatient Medications:     acetaminophen (Tylenol) 325 mg tablet, GIVE TWO (2) TABLETS (650MG) BY MOUTH THREE TIMES DAILY., Disp: 174 tablet, Rfl: 11    amLODIPine (Norvasc) 2.5 mg tablet, GIVE ONE TABLET BY MOUTH ONCE DAILY FOR HYPERTENSION, Disp: 30 tablet, Rfl: 11    carbamide peroxide (Debrox) 6.5 % otic solution, Administer 5 drops into each ear 2 times a day. Administer 5 drops into each ear 2 times daily 4 days prior to doctors appointment only then stop, Disp: 15 mL, Rfl: 0    cholecalciferol (Vitamin D-3) 50 MCG (2000 UT) tablet, GIVE ONE TABLET BY MOUTH ONCE DAILY FOR VITAMIN D DEFICIENCY, Disp: 30 tablet, Rfl: 11    famotidine (Pepcid) 20 mg tablet, Take 1 tablet (20 mg) by mouth as needed at bedtime., Disp: , Rfl:     fluticasone (Flonase) 50 mcg/actuation nasal spray, Administer 1 spray into each nostril once daily., Disp: 16 g, Rfl: 11    lamoTRIgine (LaMICtal) 100 mg tablet, Take 1 tablet (100 mg) by mouth 2 times a day., Disp: , Rfl:     levETIRAcetam (Keppra) 500 mg tablet, Take 1 tablet (500 mg) by mouth 2 times a day., Disp: , Rfl:     lisinopril 10 mg tablet, GIVE ONE TABLET BY MOUTH EVERY TWELVE HOURS FOR HYPERTENSION, Disp: 60 tablet, Rfl: 11    loperamide  (Imodium A-D) 1 mg/7.5 mL liquid, GIVE 2 TABLESPOONSFUL (30ML) BY MOUTH INITIALLY THEN GIVE 1 TABLESPOONFUL (15ML) BY MOUTH AFTER EACH LOOSE STOOL AS NEEDED. MAX QTY: 4 TABLESPOONSFUL (60ML)/DAY, Disp: 360 mL, Rfl: 11    loratadine (Claritin) 10 mg tablet, GIVE ONE TABLET BY MOUTH ONCE DAILY FOR SEASONAL ALLERGY SYMPTOMS, Disp: 30 tablet, Rfl: 11    mirtazapine (Remeron) 7.5 mg tablet, GIVE ONE TABLET BY MOUTH AT BEDTIME. (DX: INSOMNIA), Disp: 30 tablet, Rfl: 11    Myrbetriq 25 mg tablet extended release 24 hr 24 hr tablet, GIVE ONE TABLET BY MOUTH ONCE DAILY (DX: BPH), Disp: 30 tablet, Rfl: 11    Nizoral A-D 1 % shampoo, USE AS DIRECTED TO SHAMPOO LIBERALLY TO SKIN TWICE WEEKLY    **REORDER WHEN NEEDED-NOT A CYCLE FILL MED**, Disp: 200 mL, Rfl: 10    omeprazole (PriLOSEC) 20 mg DR capsule, GIVE ONE CAPSULE BY MOUTH EVERY MORNING 30 MINUTES BEFORE MORNING MEAL (DX: GASTRITIS), Disp: 30 capsule, Rfl: 11    QUEtiapine (SEROquel) 25 mg tablet, Take 1 tablet (25 mg) by mouth 2 times a day., Disp: , Rfl:     sertraline (Zoloft) 100 mg tablet, Take 1 tablet (100 mg) by mouth once daily., Disp: , Rfl:     sotalol (Betapace) 120 mg tablet, GIVE ONE TABLET BY MOUTH TWICE DAILY (DX: PAROXYSMAL A-FIB), Disp: 60 tablet, Rfl: 11    tamsulosin (Flomax) 0.4 mg 24 hr capsule, GIVE ONE CAPSULE BY MOUTH AT BEDTIME (DX: BPH), Disp: 30 capsule, Rfl: 11     mL, Rfl: 10    omeprazole (PriLOSEC) 20 mg DR capsule, GIVE ONE CAPSULE BY MOUTH EVERY MORNING 30 MINUTES BEFORE MORNING MEAL (DX: GASTRITIS), Disp: 30 capsule, Rfl: 11    QUEtiapine (SEROquel) 25 mg tablet, Take 1 tablet (25 mg) by mouth 2 times a day., Disp: , Rfl:     sertraline (Zoloft) 100 mg tablet, Take 1 tablet (100 mg) by mouth once daily., Disp: , Rfl:     sotalol (Betapace) 120 mg tablet, GIVE ONE TABLET BY MOUTH TWICE DAILY (DX: PAROXYSMAL A-FIB), Disp: 60 tablet, Rfl: 11    tamsulosin (Flomax) 0.4 mg 24 hr capsule, GIVE ONE CAPSULE BY MOUTH AT BEDTIME (DX: BPH), Disp: 30 capsule, Rfl: 11    Bacillus coagulans-B. subtilis (Probiotic Duo) 1.5 billion cell tablet,chewable, Chew 1 tablet early in the morning.., Disp: 90 tablet, Rfl: 3    Review of Systems   Unable to perform ROS: Patient nonverbal       Objective   There were no vitals taken for this visit.    Physical Exam  Constitutional:       Appearance: Normal appearance.      Comments: Notable for weight loss   HENT:      Head: Normocephalic and atraumatic.      Nose: Nose normal.      Mouth/Throat:      Mouth: Mucous membranes are moist.      Pharynx: Oropharynx is clear.   Eyes:      Extraocular Movements: Extraocular movements intact.      Pupils: Pupils are equal, round, and reactive to light.   Cardiovascular:      Rate and Rhythm: Normal rate and regular rhythm.      Heart sounds: No murmur heard.  Pulmonary:      Effort: Pulmonary effort is normal.      Breath sounds: Normal breath sounds.   Abdominal:      General: Bowel sounds are normal.      Palpations: Abdomen is soft.   Musculoskeletal:      Cervical back: Neck supple. No tenderness.      Comments: Non ambulatory  Repetative rocking motion     Assessment/Plan   Diagnoses and all orders for this visit:  Irritable bowel syndrome with both constipation and diarrhea  Comments:  Will trial probiotic daily for gut issues.  Orders:  -     Bacillus coagulans-B. subtilis (Probiotic Duo) 1.5  billion cell tablet,chewable; Chew 1 tablet early in the morning..  Essential hypertension  Comments:  Managed - cont amlodipin 2.5mg daily, [id number] daily  Diarrhea, unspecified type  Urinary incontinence without sensory awareness  Insomnia due to other mental disorder  Severe intellectual disability  Seizure disorder (Multi)  Comments:  Managed - cont keppray 500mg bid, lamictal 100mg bid  Impaired ambulation    More than 50% of time spent in face-to-face discussion @ a total of 40 minutes with this patient on counseling, coordination of care, and review of medical records and diagnostics. Advised pt to contact house calls office with any acute concerns or medication needs.     Will continue with [facility] home visits. Patient has limited support, limited mobility and unable to navigate to traditional office appointments.  Follow up in 2-3 months       LIVIA Thompson          [1]   Current Outpatient Medications:     acetaminophen (Tylenol) 325 mg tablet, GIVE TWO (2) TABLETS (650MG) BY MOUTH THREE TIMES DAILY., Disp: 174 tablet, Rfl: 11    amLODIPine (Norvasc) 2.5 mg tablet, GIVE ONE TABLET BY MOUTH ONCE DAILY FOR HYPERTENSION, Disp: 30 tablet, Rfl: 11    carbamide peroxide (Debrox) 6.5 % otic solution, Administer 5 drops into each ear 2 times a day. Administer 5 drops into each ear 2 times daily 4 days prior to doctors appointment only then stop, Disp: 15 mL, Rfl: 0    cholecalciferol (Vitamin D-3) 50 MCG (2000 UT) tablet, GIVE ONE TABLET BY MOUTH ONCE DAILY FOR VITAMIN D DEFICIENCY, Disp: 30 tablet, Rfl: 11    famotidine (Pepcid) 20 mg tablet, Take 1 tablet (20 mg) by mouth as needed at bedtime., Disp: , Rfl:     fluticasone (Flonase) 50 mcg/actuation nasal spray, Administer 1 spray into each nostril once daily., Disp: 16 g, Rfl: 11    lamoTRIgine (LaMICtal) 100 mg tablet, Take 1 tablet (100 mg) by mouth 2 times a day., Disp: , Rfl:     levETIRAcetam (Keppra) 500 mg tablet,  Take 1 tablet (500 mg) by mouth 2 times a day., Disp: , Rfl:     lisinopril 10 mg tablet, GIVE ONE TABLET BY MOUTH EVERY TWELVE HOURS FOR HYPERTENSION, Disp: 60 tablet, Rfl: 11    loperamide (Imodium A-D) 1 mg/7.5 mL liquid, GIVE 2 TABLESPOONSFUL (30ML) BY MOUTH INITIALLY THEN GIVE 1 TABLESPOONFUL (15ML) BY MOUTH AFTER EACH LOOSE STOOL AS NEEDED. MAX QTY: 4 TABLESPOONSFUL (60ML)/DAY, Disp: 360 mL, Rfl: 11    loratadine (Claritin) 10 mg tablet, GIVE ONE TABLET BY MOUTH ONCE DAILY FOR SEASONAL ALLERGY SYMPTOMS, Disp: 30 tablet, Rfl: 11    mirtazapine (Remeron) 7.5 mg tablet, GIVE ONE TABLET BY MOUTH AT BEDTIME. (DX: INSOMNIA), Disp: 30 tablet, Rfl: 11    Myrbetriq 25 mg tablet extended release 24 hr 24 hr tablet, GIVE ONE TABLET BY MOUTH ONCE DAILY (DX: BPH), Disp: 30 tablet, Rfl: 11    Nizoral A-D 1 % shampoo, USE AS DIRECTED TO SHAMPOO LIBERALLY TO SKIN TWICE WEEKLY    **REORDER WHEN NEEDED-NOT A CYCLE FILL MED**, Disp: 200 mL, Rfl: 10    omeprazole (PriLOSEC) 20 mg DR capsule, GIVE ONE CAPSULE BY MOUTH EVERY MORNING 30 MINUTES BEFORE MORNING MEAL (DX: GASTRITIS), Disp: 30 capsule, Rfl: 11    QUEtiapine (SEROquel) 25 mg tablet, Take 1 tablet (25 mg) by mouth 2 times a day., Disp: , Rfl:     sertraline (Zoloft) 100 mg tablet, Take 1 tablet (100 mg) by mouth once daily., Disp: , Rfl:     sotalol (Betapace) 120 mg tablet, GIVE ONE TABLET BY MOUTH TWICE DAILY (DX: PAROXYSMAL A-FIB), Disp: 60 tablet, Rfl: 11    tamsulosin (Flomax) 0.4 mg 24 hr capsule, GIVE ONE CAPSULE BY MOUTH AT BEDTIME (DX: BPH), Disp: 30 capsule, Rfl: 11    Bacillus coagulans-B. subtilis (Probiotic Duo) 1.5 billion cell tablet,chewable, Chew 1 tablet early in the morning.., Disp: 90 tablet, Rfl: 3

## 2025-04-21 ENCOUNTER — TELEPHONE (OUTPATIENT)
Dept: PRIMARY CARE | Facility: CLINIC | Age: 70
End: 2025-04-21
Payer: MEDICARE

## 2025-04-21 RX ORDER — LACTO19/BIFIDO/L.LACTIS/P.ACID 5B CELL
1 CAPSULE,DELAYED RELEASE (ENTERIC COATED) ORAL
Qty: 90 TABLET | Refills: 3 | Status: SHIPPED | OUTPATIENT
Start: 2025-04-21 | End: 2026-04-21

## 2025-04-21 NOTE — TELEPHONE ENCOUNTER
Arlette calls today stating the probiotic that was discussed was not called in to pharmacy yet. This nurse lets Arlette know provider is out of office.  Arlette in agreement probiotic is not emergent medication, can wait for provider return.  Please send as able.

## 2025-04-28 PROBLEM — K58.2 IRRITABLE BOWEL SYNDROME WITH BOTH CONSTIPATION AND DIARRHEA: Status: ACTIVE | Noted: 2025-04-28

## 2025-08-07 ENCOUNTER — APPOINTMENT (OUTPATIENT)
Dept: RADIOLOGY | Facility: HOSPITAL | Age: 70
End: 2025-08-07
Payer: MEDICARE

## 2025-08-07 ENCOUNTER — HOSPITAL ENCOUNTER (EMERGENCY)
Facility: HOSPITAL | Age: 70
Discharge: HOME | End: 2025-08-07
Attending: EMERGENCY MEDICINE
Payer: MEDICARE

## 2025-08-07 VITALS
DIASTOLIC BLOOD PRESSURE: 46 MMHG | WEIGHT: 135 LBS | SYSTOLIC BLOOD PRESSURE: 103 MMHG | RESPIRATION RATE: 20 BRPM | HEIGHT: 72 IN | TEMPERATURE: 99 F | BODY MASS INDEX: 18.28 KG/M2 | HEART RATE: 130 BPM

## 2025-08-07 DIAGNOSIS — S10.91XA ABRASION OF NECK, INITIAL ENCOUNTER: ICD-10-CM

## 2025-08-07 DIAGNOSIS — S09.90XA INJURY OF HEAD, INITIAL ENCOUNTER: Primary | ICD-10-CM

## 2025-08-07 PROCEDURE — 70450 CT HEAD/BRAIN W/O DYE: CPT | Performed by: STUDENT IN AN ORGANIZED HEALTH CARE EDUCATION/TRAINING PROGRAM

## 2025-08-07 PROCEDURE — 70450 CT HEAD/BRAIN W/O DYE: CPT

## 2025-08-07 PROCEDURE — 72125 CT NECK SPINE W/O DYE: CPT

## 2025-08-07 PROCEDURE — 2500000004 HC RX 250 GENERAL PHARMACY W/ HCPCS (ALT 636 FOR OP/ED)

## 2025-08-07 PROCEDURE — 96372 THER/PROPH/DIAG INJ SC/IM: CPT

## 2025-08-07 PROCEDURE — 72125 CT NECK SPINE W/O DYE: CPT | Performed by: STUDENT IN AN ORGANIZED HEALTH CARE EDUCATION/TRAINING PROGRAM

## 2025-08-07 PROCEDURE — 99284 EMERGENCY DEPT VISIT MOD MDM: CPT | Mod: 25 | Performed by: EMERGENCY MEDICINE

## 2025-08-07 RX ORDER — DIAZEPAM 5 MG/ML
5 INJECTION, SOLUTION INTRAMUSCULAR; INTRAVENOUS ONCE
Status: COMPLETED | OUTPATIENT
Start: 2025-08-07 | End: 2025-08-07

## 2025-08-07 RX ORDER — DIAZEPAM 5 MG/1
5 TABLET ORAL ONCE
Status: DISCONTINUED | OUTPATIENT
Start: 2025-08-07 | End: 2025-08-07

## 2025-08-07 RX ADMIN — DIAZEPAM 5 MG: 10 INJECTION, SOLUTION INTRAMUSCULAR; INTRAVENOUS at 19:58

## 2025-08-07 NOTE — ED PROVIDER NOTES
HPI   Chief Complaint   Patient presents with    Fall     Fell backwards in his wheelchair and hit head on pavement.       Patient is a 70-year-old male presents emergency department for evaluation of fall with head injury.  Patient is MRDD and is companied by caregiver.  Reportedly patient was getting up into the van when his wheelchair tipped backward and he hit the back of his head.  Unknown if patient lost any consciousness.  He is not currently on any blood thinners.  He sustained an abrasion to the posterior neck but is moving his neck without any difficulty.  He has been at his mental baseline otherwise with no other concerns.  They are here for imaging of the head and neck given head injury.      History provided by:  Caregiver   used: No            Patient History   Medical History[1]  Surgical History[2]  Family History[3]  Social History[4]    Physical Exam   ED Triage Vitals [08/07/25 1608]   Temperature Heart Rate Respirations BP   37.2 °C (99 °F) (!) 130 20 (!) 103/46      SpO2 Temp Source Heart Rate Source Patient Position   -- Temporal -- Sitting      BP Location FiO2 (%)     Left arm --       Physical Exam  Constitutional:       Appearance: Normal appearance.   HENT:      Head: Normocephalic.      Comments: Soft helmet in place with no tenderness to palpation or palpable crepitus throughout scalp.    Eyes:      Extraocular Movements: Extraocular movements intact.      Pupils: Pupils are equal, round, and reactive to light.     Neck:      Comments: Abrasion to posterior cervical spine with no midline spinal tenderness or bony step-offs.  Cardiovascular:      Rate and Rhythm: Normal rate and regular rhythm.   Pulmonary:      Effort: Pulmonary effort is normal.      Breath sounds: Normal breath sounds.     Musculoskeletal:         General: Normal range of motion.      Cervical back: Normal range of motion.     Skin:     General: Skin is warm and dry.     Neurological:      General:  No focal deficit present.      Mental Status: He is alert. Mental status is at baseline.           ED Course & MDM   Diagnoses as of 08/09/25 1240   Injury of head, initial encounter   Abrasion of neck, initial encounter                 No data recorded                                 Medical Decision Making  Patient is a 70-year-old male presents emergency department for evaluation of fall with head injury and neck injury.    Lab work not warranted at today's visit.    Scans done today were interpreted/confirmed by radiologist and also interpreted by me which included CT head and neck without contrast.    Medications given at today's visit include PO Valium    I saw this patient in conjunction with Dr. Jacobo.  Patient remains well-appearing moving himself around the emergency department without difficulty in his wheelchair at mental baseline.  He does have small abrasion to posterior neck over paraspinal region with no midline spinal tenderness or bony step-offs.  Otherwise remains well-appearing without any other injury other than head and neck.  Initial CT imaging nondiagnostic as patient was moving within CT scan and therefore CT scan redone with some sedative medication.  Repeat CT scan shows no evidence for acute intracranial abnormality with no acute cervical spine injury.  Patient is stable to be discharged back to group home in the company of caregiver.  They were educated on close follow-up with primary care provider in the following week.  Emergent pathologies were considered for this patient, although I have low suspicion for anything acutely emergent given patient's clinical presentation, history, physical exam, stable vital signs, and relatively unremarkable workup.  Discharging patient home is reasonable plan of care for outpatient management.    All labs, imaging, and diagnostic studies were reviewed by me and caregiver was counseled on clinical impression, expectations, and plan.  Caregiver was  educated to follow-up with PCP in the following 1-2 days.  All questions from caregiver were answered. They elicited understanding and were agreeable to course of treatment.  Patient was discharged in stable condition and given strict return precautions..    ** Disclaimer:  Parts of this document were written utilizing a voice to text dictation software.  Note may contain minor transcription or typographical errors that were inadvertently transcribed by the computer software.        Procedure  Procedures       [1]   Past Medical History:  Diagnosis Date    Accidental fall 07/24/2024    Acute bronchitis 03/21/2024    Acute exacerbation of chronic obstructive airways disease (Multi) 05/14/2024    Acute renal failure syndrome 07/24/2024    Acute respiratory failure 05/14/2024    Clouded consciousness 07/24/2024    Colitis     presumed ischemic    Community acquired pneumonia     CP (cerebral palsy) (Multi)     Fever 03/21/2024    Hematoma 07/24/2024    HTN (hypertension)     Hyperlipidemia     Impacted cerumen of left ear 09/08/2022    Paroxysmal atrial fibrillation (Multi)     Pneumonia     Recurrent seizures (Multi)     Sepsis (Multi) 03/21/2024    Severe developmental delay    [2] No past surgical history on file.  [3] No family history on file.  [4]   Social History  Tobacco Use    Smoking status: Never    Smokeless tobacco: Never   Substance Use Topics    Alcohol use: Not on file    Drug use: Not on file        Daisy Mattson PA-C  08/09/25 4714

## 2025-08-08 NOTE — DISCHARGE INSTRUCTIONS
Please follow-up closely with his primary care provider in the following week.  Continue Tylenol ibuprofen as needed for aches and pains.

## 2025-08-27 ENCOUNTER — TELEPHONE (OUTPATIENT)
Dept: PRIMARY CARE | Facility: CLINIC | Age: 70
End: 2025-08-27
Payer: MEDICARE

## 2025-09-25 ENCOUNTER — APPOINTMENT (OUTPATIENT)
Facility: CLINIC | Age: 70
End: 2025-09-25
Payer: MEDICARE